# Patient Record
Sex: FEMALE | Race: WHITE | NOT HISPANIC OR LATINO | Employment: OTHER | URBAN - METROPOLITAN AREA
[De-identification: names, ages, dates, MRNs, and addresses within clinical notes are randomized per-mention and may not be internally consistent; named-entity substitution may affect disease eponyms.]

---

## 2022-10-13 ENCOUNTER — TELEPHONE (OUTPATIENT)
Dept: OBSTETRICS AND GYNECOLOGY | Facility: CLINIC | Age: 32
End: 2022-10-13
Payer: MEDICAID

## 2022-10-13 NOTE — TELEPHONE ENCOUNTER
----- Message from Fredrick Lynch sent at 10/13/2022 12:36 PM CDT -----  Type:  Sooner Appointment Request    Caller is requesting a sooner appointment.  Caller declined first available appointment listed below.  Caller will not accept being placed on the waitlist and is requesting a message be sent to doctor.    Name of Caller:  Pt  When is the first available appointment?   Denied scheduling  Symptoms:  needs to change IUD in arm  Best Call Back Number:  [phone]     Additional Information:  Trying to est care and get her implant changed.  Please advise -- thank you

## 2022-11-03 ENCOUNTER — OFFICE VISIT (OUTPATIENT)
Dept: OBSTETRICS AND GYNECOLOGY | Facility: CLINIC | Age: 32
End: 2022-11-03
Payer: MEDICAID

## 2022-11-03 VITALS
SYSTOLIC BLOOD PRESSURE: 114 MMHG | WEIGHT: 147.06 LBS | DIASTOLIC BLOOD PRESSURE: 76 MMHG | BODY MASS INDEX: 23.08 KG/M2 | HEIGHT: 67 IN | RESPIRATION RATE: 18 BRPM

## 2022-11-03 DIAGNOSIS — Z01.812 PRE-PROCEDURE LAB EXAM: ICD-10-CM

## 2022-11-03 DIAGNOSIS — Z01.419 ENCOUNTER FOR ROUTINE GYNECOLOGICAL EXAMINATION WITH PAPANICOLAOU SMEAR OF CERVIX: Primary | ICD-10-CM

## 2022-11-03 LAB
B-HCG UR QL: NEGATIVE
CTP QC/QA: YES

## 2022-11-03 PROCEDURE — 99213 OFFICE O/P EST LOW 20 MIN: CPT | Mod: PBBFAC,PN | Performed by: SPECIALIST

## 2022-11-03 PROCEDURE — 99385 PR PREVENTIVE VISIT,NEW,18-39: ICD-10-PCS | Mod: S$PBB,,, | Performed by: SPECIALIST

## 2022-11-03 PROCEDURE — 88175 CYTOPATH C/V AUTO FLUID REDO: CPT | Performed by: SPECIALIST

## 2022-11-03 PROCEDURE — 3078F DIAST BP <80 MM HG: CPT | Mod: CPTII,,, | Performed by: SPECIALIST

## 2022-11-03 PROCEDURE — 1159F PR MEDICATION LIST DOCUMENTED IN MEDICAL RECORD: ICD-10-PCS | Mod: CPTII,,, | Performed by: SPECIALIST

## 2022-11-03 PROCEDURE — 3008F PR BODY MASS INDEX (BMI) DOCUMENTED: ICD-10-PCS | Mod: CPTII,,, | Performed by: SPECIALIST

## 2022-11-03 PROCEDURE — 3008F BODY MASS INDEX DOCD: CPT | Mod: CPTII,,, | Performed by: SPECIALIST

## 2022-11-03 PROCEDURE — 3074F SYST BP LT 130 MM HG: CPT | Mod: CPTII,,, | Performed by: SPECIALIST

## 2022-11-03 PROCEDURE — 99999 PR PBB SHADOW E&M-EST. PATIENT-LVL III: CPT | Mod: PBBFAC,,, | Performed by: SPECIALIST

## 2022-11-03 PROCEDURE — 3078F PR MOST RECENT DIASTOLIC BLOOD PRESSURE < 80 MM HG: ICD-10-PCS | Mod: CPTII,,, | Performed by: SPECIALIST

## 2022-11-03 PROCEDURE — 3074F PR MOST RECENT SYSTOLIC BLOOD PRESSURE < 130 MM HG: ICD-10-PCS | Mod: CPTII,,, | Performed by: SPECIALIST

## 2022-11-03 PROCEDURE — 81025 URINE PREGNANCY TEST: CPT | Mod: PBBFAC,PN | Performed by: SPECIALIST

## 2022-11-03 PROCEDURE — 99999 PR PBB SHADOW E&M-EST. PATIENT-LVL III: ICD-10-PCS | Mod: PBBFAC,,, | Performed by: SPECIALIST

## 2022-11-03 PROCEDURE — 99385 PREV VISIT NEW AGE 18-39: CPT | Mod: S$PBB,,, | Performed by: SPECIALIST

## 2022-11-03 PROCEDURE — 1159F MED LIST DOCD IN RCRD: CPT | Mod: CPTII,,, | Performed by: SPECIALIST

## 2022-11-03 PROCEDURE — 87624 HPV HI-RISK TYP POOLED RSLT: CPT | Performed by: SPECIALIST

## 2022-11-03 RX ORDER — QUETIAPINE FUMARATE 50 MG/1
50 TABLET, FILM COATED ORAL NIGHTLY
COMMUNITY
Start: 2022-11-02

## 2022-11-03 RX ORDER — IPRATROPIUM BROMIDE AND ALBUTEROL 20; 100 UG/1; UG/1
1 SPRAY, METERED RESPIRATORY (INHALATION) EVERY 6 HOURS
COMMUNITY
Start: 2022-07-27

## 2022-11-03 RX ORDER — ETONOGESTREL 68 MG/1
1 IMPLANT SUBCUTANEOUS
COMMUNITY

## 2022-11-03 RX ORDER — LEVOCETIRIZINE DIHYDROCHLORIDE 5 MG/1
5 TABLET, FILM COATED ORAL DAILY
COMMUNITY
Start: 2022-07-27

## 2022-11-03 RX ORDER — ETONOGESTREL AND ETHINYL ESTRADIOL VAGINAL RING .015; .12 MG/D; MG/D
1 RING VAGINAL
Qty: 1 EACH | Refills: 11 | Status: SHIPPED | OUTPATIENT
Start: 2022-11-03 | End: 2023-11-03

## 2022-11-03 RX ORDER — BUDESONIDE AND FORMOTEROL FUMARATE DIHYDRATE 80; 4.5 UG/1; UG/1
AEROSOL RESPIRATORY (INHALATION)
COMMUNITY
Start: 2022-07-27

## 2022-11-03 RX ORDER — ESCITALOPRAM OXALATE 20 MG/1
20 TABLET ORAL DAILY
COMMUNITY
Start: 2022-11-02

## 2022-11-03 NOTE — PROGRESS NOTES
33 yo WF  presents for annual gyn eval as well as contraceptive management LMP   pt has nexplanon in place for 4 years. Discussed expiration need for contraception  Discussed options, Nexplanon replacement, IUD DPMA, NuvaRing and patch as well as OCP  No contraindications  After discussion, pt desires NuvaRing  Discussed dsoing  No past medical history on file.    No past surgical history on file.    No family history on file.    Social History     Socioeconomic History    Marital status: Single   Tobacco Use    Smoking status: Never     Passive exposure: Never    Smokeless tobacco: Current    Tobacco comments:     sporadic       Current Outpatient Medications   Medication Sig Dispense Refill    COMBIVENT RESPIMAT  mcg/actuation inhaler Inhale 1 puff into the lungs every 6 (six) hours.      EScitalopram oxalate (LEXAPRO) 20 MG tablet Take 20 mg by mouth once daily.      etonogestreL (NEXPLANON) 68 mg Impl subdermal device Inject 1 each into the skin.      levocetirizine (XYZAL) 5 MG tablet Take 5 mg by mouth once daily.      QUEtiapine (SEROQUEL) 50 MG tablet Take 50 mg by mouth every evening.      SYMBICORT 80-4.5 mcg/actuation HFAA SMARTSI Puff(s) By Mouth Twice Daily       No current facility-administered medications for this visit.       Review of patient's allergies indicates:   Allergen Reactions    Aspirin     Codeine sulfate     Ibuprofen        Review of System:   General: no chills, fever, night sweats, weight gain or weight loss  Psychological: no depression or suicidal ideation  Breasts: no new or changing breast lumps, nipple discharge or masses.  Respiratory: no cough, shortness of breath, or wheezing  Cardiovascular: no chest pain or dyspnea on exertion  Gastrointestinal: no abdominal pain, change in bowel habits, or black or bloody stools  Genito-Urinary: no incontinence, urinary frequency/urgency or vulvar/vaginal symptoms, pelvic pain or abnormal vaginal  bleeding.  Musculoskeletal: no gait disturbance or muscular weakness                                               General Appearance    A and O x 4, Cooperative, no distress   Breasts    Abdomen   Deferred    Soft, non-tender, bowel sounds active all four quadrants,  no masses, no organomegaly    Genitourinary:   External rectal exam shows no thrombosed external hemorrhoids.   Pelvic exam was performed with patient supine.  No labial fusion.  There is no rash, lesion or injury on the right labia.  There is no rash, lesion or injury on the left labia.  No bleeding and no signs of injury around the vaginal introitus, urethra is without lesions and well supported. The cervix is visualized with no discharge, lesions or friability.  No vaginal discharge found.  No significant Cystocele, Enterocele or rectocele, and uterus well supported.  Bimanual exam:  The urethra is normal to palpation and there are no palpable vaginal wall masses.  Uterus is not deviated, not enlarged, not fixed, normal shape and not tender.  Cervix exhibits no motion tenderness.   Right adnexum displays no mass and no tenderness.  Left adnexum displays no mass and no tenderness.   Extremities: Extremities normal, atraumatic, no cyanosis or edema                     NOTE  NURSING PERSONAL PRESENT FOR ENTIRE PHYSICAL EXAM     PAP submitted    Plan UPT andif negative will prescribe NuvaRing  Pt is to RTO next week for Nexplanon removal  Answered all questions

## 2022-11-09 LAB
CLINICAL INFO: ABNORMAL
CYTO CVX: ABNORMAL
CYTOLOGIST CVX/VAG CYTO: ABNORMAL
CYTOLOGIST CVX/VAG CYTO: ABNORMAL
CYTOLOGY CMNT CVX/VAG CYTO-IMP: ABNORMAL
CYTOLOGY PAP THIN PREP EXPLANATION: ABNORMAL
DATE OF PREVIOUS PAP: ABNORMAL
DATE PREVIOUS BX: NO
GEN CATEG CVX/VAG CYTO-IMP: ABNORMAL
HPV I/H RISK 4 DNA CVX QL NAA+PROBE: NOT DETECTED
LMP START DATE: ABNORMAL
MICROORGANISM CVX/VAG CYTO: ABNORMAL
PATHOLOGIST CVX/VAG CYTO: ABNORMAL
SERVICE CMNT-IMP: ABNORMAL
SPECIMEN SOURCE CVX/VAG CYTO: ABNORMAL
STAT OF ADQ CVX/VAG CYTO-IMP: ABNORMAL

## 2022-11-10 ENCOUNTER — TELEPHONE (OUTPATIENT)
Dept: OBSTETRICS AND GYNECOLOGY | Facility: CLINIC | Age: 32
End: 2022-11-10
Payer: MEDICAID

## 2022-11-10 NOTE — TELEPHONE ENCOUNTER
----- Message from David Palomares sent at 11/10/2022  8:15 AM CST -----  Contact: Self  Type:  Sooner Appointment Request    Caller is requesting a sooner appointment.  Caller declined first available appointment listed below.  Caller will not accept being placed on the waitlist and is requesting a message be sent to doctor.    Name of Caller:  Patient  When is the first available appointment?  12/14  Symptoms:  nexplanon removal  Best Call Back Number:  [phone]   Additional Information:   States she overslept, would like to r/s to remove implant.

## 2023-03-27 ENCOUNTER — HOSPITAL ENCOUNTER (EMERGENCY)
Facility: HOSPITAL | Age: 33
Discharge: HOME | End: 2023-03-27
Attending: EMERGENCY MEDICINE
Payer: MEDICAID

## 2023-03-27 VITALS
BODY MASS INDEX: 27.78 KG/M2 | HEART RATE: 70 BPM | DIASTOLIC BLOOD PRESSURE: 81 MMHG | TEMPERATURE: 98 F | RESPIRATION RATE: 15 BRPM | HEIGHT: 67 IN | OXYGEN SATURATION: 99 % | WEIGHT: 177 LBS | SYSTOLIC BLOOD PRESSURE: 122 MMHG

## 2023-03-27 DIAGNOSIS — L02.519: Primary | ICD-10-CM

## 2023-03-27 LAB
B-HCG UR QL: NEGATIVE
CTP QC/QA: YES

## 2023-03-27 PROCEDURE — 81025 URINE PREGNANCY TEST: CPT | Mod: ER

## 2023-03-27 PROCEDURE — 25000003 PHARM REV CODE 250: Mod: ER

## 2023-03-27 PROCEDURE — 99283 EMERGENCY DEPT VISIT LOW MDM: CPT | Mod: 25,ER

## 2023-03-27 PROCEDURE — 10061 I&D ABSCESS COMP/MULTIPLE: CPT | Mod: ER

## 2023-03-27 RX ORDER — MUPIROCIN 20 MG/G
OINTMENT TOPICAL 3 TIMES DAILY
Qty: 1 G | Refills: 0 | Status: SHIPPED | OUTPATIENT
Start: 2023-03-27 | End: 2023-04-01

## 2023-03-27 RX ORDER — MUPIROCIN 20 MG/G
OINTMENT TOPICAL
Status: COMPLETED | OUTPATIENT
Start: 2023-03-27 | End: 2023-03-27

## 2023-03-27 RX ORDER — LIDOCAINE HYDROCHLORIDE 10 MG/ML
10 INJECTION INFILTRATION; PERINEURAL
Status: COMPLETED | OUTPATIENT
Start: 2023-03-27 | End: 2023-03-27

## 2023-03-27 RX ADMIN — LIDOCAINE HYDROCHLORIDE 10 ML: 10 INJECTION, SOLUTION INFILTRATION; PERINEURAL at 06:03

## 2023-03-27 RX ADMIN — MUPIROCIN: 20 OINTMENT TOPICAL at 08:03

## 2023-03-28 NOTE — DISCHARGE INSTRUCTIONS

## 2023-03-28 NOTE — ED PROVIDER NOTES
Encounter Date: 3/27/2023    SCRIBE #1 NOTE: I, Tonya Qureshi, am scribing for, and in the presence of,  Sammy Pineda PA-C. I have scribed the following portions of the note - Other sections scribed: HPI, ROS.     History     Chief Complaint   Patient presents with    Finger Pain     Pt c/o redness and swelling to L 5th digit x1 day     33 year old female with no known PMHx presents to the ED for abscess to L 5th digit that she noticed this morning. Pt reports that she visited another ED 2 weeks ago after she sprained her left 4th digit. Pt states that she now has an abscess on her 5th digit from her provider wrapping her finger too tightly. She denies any recent trauma. Pt reports attempting to alleviate her symptoms with Tylenol(1 day ago). Pt is allergic to Ibuprofen, codeine, and Aspirin (eyes swell). LMP 02/23/2023. Denies fever, chills, SOB, rhinorrhea, congestion, chest pain, dysuria, hematuria, nausea, vomiting, diarrhea, sore throat, abdominal pain.    The history is provided by the patient. No  was used.   Review of patient's allergies indicates:   Allergen Reactions    Aspirin     Codeine sulfate     Ibuprofen      No past medical history on file.  No past surgical history on file.  No family history on file.  Social History     Tobacco Use    Smoking status: Never     Passive exposure: Never    Smokeless tobacco: Current    Tobacco comments:     sporadic     Review of Systems   Constitutional:  Negative for chills, diaphoresis, fatigue and fever.   HENT:  Negative for congestion, ear pain, nosebleeds, rhinorrhea, sinus pain, sore throat and voice change.    Eyes:  Negative for pain and redness.   Respiratory:  Negative for cough, shortness of breath and wheezing.    Cardiovascular:  Negative for chest pain and leg swelling.   Gastrointestinal:  Negative for abdominal distention, abdominal pain, blood in stool, diarrhea, nausea and vomiting.   Genitourinary:  Negative for decreased  urine volume, difficulty urinating, dysuria, flank pain, frequency, hematuria and urgency.   Musculoskeletal:  Negative for back pain, joint swelling, neck pain and neck stiffness.   Skin:  Negative for rash and wound.        (+)abscess   Neurological:  Negative for syncope, weakness and headaches.   Psychiatric/Behavioral:  Negative for confusion. The patient is not nervous/anxious.      Physical Exam     Initial Vitals [03/27/23 1649]   BP Pulse Resp Temp SpO2   127/83 103 16 97.9 °F (36.6 °C) 98 %      MAP       --         Physical Exam    Nursing note and vitals reviewed.  Constitutional: She appears well-developed and well-nourished.  Non-toxic appearance. She does not appear ill.   HENT:   Head: Normocephalic and atraumatic.   Right Ear: Hearing, tympanic membrane, external ear and ear canal normal. Tympanic membrane is not perforated, not erythematous and not bulging.   Left Ear: Hearing, tympanic membrane, external ear and ear canal normal. Tympanic membrane is not perforated, not erythematous and not bulging.   Nose: Nose normal.   Mouth/Throat: Uvula is midline, oropharynx is clear and moist and mucous membranes are normal.   Eyes: Conjunctivae and EOM are normal.   Neck: Neck supple.   Normal range of motion.   Full passive range of motion without pain.     Cardiovascular:  Normal rate and regular rhythm.           Pulses:       Radial pulses are 2+ on the right side and 2+ on the left side.   Pulmonary/Chest: Effort normal and breath sounds normal. No accessory muscle usage. No respiratory distress. She has no decreased breath sounds.   Abdominal: Abdomen is soft. Bowel sounds are normal. She exhibits no distension. There is no abdominal tenderness. There is no rebound and no guarding.   Musculoskeletal:         General: Normal range of motion.      Cervical back: Full passive range of motion without pain, normal range of motion and neck supple. No rigidity.      Comments: Full range of motion of  bilateral fingers, wrists, elbows, shoulders.  Strength and sensation intact to bilateral upper extremities.  Patient has full range of motion of left 5th digit at the MCP PIP and DIP joints.     Neurological: She is alert. No cranial nerve deficit.   Neuro intact.  Strength and sensation intact bilateral upper and lower extremities.   Skin: Skin is warm and dry.   Abscess noted to lateral aspect of left 5th digit localized to the middle and distal phalanges.  No evidence of any fusiform swelling, pain with passive extension, passive flexion, or tenderness to entire finger sheath.     Psychiatric: She has a normal mood and affect.       ED Course   I & D - Incision and Drainage    Date/Time: 3/27/2023 11:24 PM  Location procedure was performed: [facility] EMERGENCY DEPARTMENT  Performed by: Sammy Pineda PA-C  Authorized by: Gavin Siu MD   Consent Done: Yes  Consent: Verbal consent obtained.  Risks and benefits: risks, benefits and alternatives were discussed  Type: abscess  Body area: upper extremity  Location details: left small finger  Anesthesia: digital block    Anesthesia:  Local Anesthetic: lidocaine 1% without epinephrine  Scalpel size: 11  Incision type: single straight  Complexity: complex  Drainage: pus, serosanguinous and purulent  Drainage amount: moderate  Wound treatment: incision, drainage, wound left open, deloculation and expression of material  Patient tolerance: Patient tolerated the procedure well with no immediate complications      Labs Reviewed   POCT URINE PREGNANCY          Imaging Results    None          Medications   LIDOcaine HCL 10 mg/ml (1%) injection 10 mL (10 mLs Infiltration Given 3/27/23 1854)   mupirocin 2 % ointment ( Topical (Top) Given 3/27/23 2007)     Medical Decision Making:   History:   Old Medical Records: I decided to obtain old medical records.  Clinical Tests:   Lab Tests: Ordered and Reviewed  The following lab test(s) were unremarkable: UPT  ED  Management:  This is a 33 year old female with no known PMHx presents to the ED for abscess to L 5th digit that she noticed this morning. On physical exam, patient is well-appearing and in no acute distress.  Nontoxic appearing.  Lungs are clear to auscultation bilaterally.  Abdomen is soft and nontender.  No guarding, rigidity, rebound.  2+ radial pulses bilaterally.  Posterior oropharynx is not erythematous.  No edema or exudate.  Uvula midline.  Bilateral tympanic membrane is normal.  No erythema, bulging, or perforations.  Neuro intact.  Strength and sensation intact bilateral upper and lower extremities.  Abscess noted to lateral aspect of left 5th digit localized to the middle and distal phalanges.  No evidence of any fusiform swelling, pain with passive extension, passive flexion, or tenderness to entire finger sheath.  Doubt flexor tenosynovitis.Full range of motion of bilateral fingers, wrists, elbows, shoulders.  Strength and sensation intact to bilateral upper extremities.  Patient has full range of motion of left 5th digit at the MCP PIP and DIP joints.  UPT negative.  I&D performed.  Patient tolerated the procedure well with no acute complications.  Moderate amount of purulent and serosanguineous fluid were expressed.  Wound was de loculated.  Bactroban applied.  Will discharge patient on Bactroban.  Urged prompt follow-up PCP for further evaluation.    Strict return precautions given. I discussed with the patient/family the diagnosis, treatment plan, indications for return to the emergency department, and for expected follow-up. The patient/family verbalized an understanding. The patient/family is asked if there are any questions or concerns. We discuss the case, until all issues are addressed to the patient/family's satisfaction. Patient/family understands and is agreeable to the plan. Patient is stable and ready for discharge.          Scribe Attestation:   Scribe #1: I performed the above scribed  service and the documentation accurately describes the services I performed. I attest to the accuracy of the note.                   Clinical Impression:   Final diagnoses:  [L02.519] Abscess of little finger (Primary)        ED Disposition Condition    Discharge Stable          ED Prescriptions       Medication Sig Dispense Start Date End Date Auth. Provider    mupirocin (BACTROBAN) 2 % ointment Apply topically 3 (three) times daily. for 5 days 1 g 3/27/2023 4/1/2023 Sammy Pineda PA-C          Follow-up Information       Follow up With Specialties Details Why Contact Info    [facility]  Schedule an appointment as soon as possible for a visit in 2 days for further evaluation [address] [place] [address]  [phone]      [facility] ED Emergency Medicine In 2 days If symptoms worsen [address] [address] [phone]             Sammy Pineda PA-C  03/27/23 4094

## 2023-09-20 ENCOUNTER — OFFICE VISIT (OUTPATIENT)
Dept: OBSTETRICS AND GYNECOLOGY | Facility: CLINIC | Age: 33
End: 2023-09-20
Payer: MEDICAID

## 2023-09-20 ENCOUNTER — LAB VISIT (OUTPATIENT)
Dept: LAB | Facility: HOSPITAL | Age: 33
End: 2023-09-20
Attending: OBSTETRICS & GYNECOLOGY
Payer: MEDICAID

## 2023-09-20 VITALS — WEIGHT: 183 LBS | BODY MASS INDEX: 28.66 KG/M2

## 2023-09-20 DIAGNOSIS — Z34.81 ENCOUNTER FOR SUPERVISION OF OTHER NORMAL PREGNANCY IN FIRST TRIMESTER: ICD-10-CM

## 2023-09-20 DIAGNOSIS — Z32.01 POSITIVE PREGNANCY TEST: ICD-10-CM

## 2023-09-20 DIAGNOSIS — Z34.81 ENCOUNTER FOR SUPERVISION OF OTHER NORMAL PREGNANCY IN FIRST TRIMESTER: Primary | ICD-10-CM

## 2023-09-20 LAB
ABO + RH BLD: NORMAL
ANION GAP SERPL CALC-SCNC: 7 MMOL/L (ref 8–16)
BASOPHILS # BLD AUTO: 0.04 K/UL (ref 0–0.2)
BASOPHILS NFR BLD: 0.6 % (ref 0–1.9)
BLD GP AB SCN CELLS X3 SERPL QL: NORMAL
BUN SERPL-MCNC: 7 MG/DL (ref 6–20)
CALCIUM SERPL-MCNC: 9.4 MG/DL (ref 8.7–10.5)
CHLORIDE SERPL-SCNC: 108 MMOL/L (ref 95–110)
CO2 SERPL-SCNC: 23 MMOL/L (ref 23–29)
CREAT SERPL-MCNC: 0.7 MG/DL (ref 0.5–1.4)
DIFFERENTIAL METHOD: ABNORMAL
EOSINOPHIL # BLD AUTO: 0.1 K/UL (ref 0–0.5)
EOSINOPHIL NFR BLD: 1.7 % (ref 0–8)
ERYTHROCYTE [DISTWIDTH] IN BLOOD BY AUTOMATED COUNT: 12.1 % (ref 11.5–14.5)
EST. GFR  (NO RACE VARIABLE): >60 ML/MIN/1.73 M^2
GLUCOSE SERPL-MCNC: 91 MG/DL (ref 70–110)
HCT VFR BLD AUTO: 41.1 % (ref 37–48.5)
HGB BLD-MCNC: 14.4 G/DL (ref 12–16)
IMM GRANULOCYTES # BLD AUTO: 0.05 K/UL (ref 0–0.04)
IMM GRANULOCYTES NFR BLD AUTO: 0.7 % (ref 0–0.5)
LYMPHOCYTES # BLD AUTO: 1.5 K/UL (ref 1–4.8)
LYMPHOCYTES NFR BLD: 21.1 % (ref 18–48)
MCH RBC QN AUTO: 31.8 PG (ref 27–31)
MCHC RBC AUTO-ENTMCNC: 35 G/DL (ref 32–36)
MCV RBC AUTO: 91 FL (ref 82–98)
MONOCYTES # BLD AUTO: 0.6 K/UL (ref 0.3–1)
MONOCYTES NFR BLD: 9.2 % (ref 4–15)
NEUTROPHILS # BLD AUTO: 4.6 K/UL (ref 1.8–7.7)
NEUTROPHILS NFR BLD: 66.7 % (ref 38–73)
NRBC BLD-RTO: 0 /100 WBC
PLATELET # BLD AUTO: 204 K/UL (ref 150–450)
PMV BLD AUTO: 11.6 FL (ref 9.2–12.9)
POTASSIUM SERPL-SCNC: 4.2 MMOL/L (ref 3.5–5.1)
RBC # BLD AUTO: 4.53 M/UL (ref 4–5.4)
SODIUM SERPL-SCNC: 138 MMOL/L (ref 136–145)
SPECIMEN OUTDATE: NORMAL
WBC # BLD AUTO: 6.87 K/UL (ref 3.9–12.7)

## 2023-09-20 PROCEDURE — 99999 PR PBB SHADOW E&M-EST. PATIENT-LVL III: CPT | Mod: PBBFAC,,, | Performed by: OBSTETRICS & GYNECOLOGY

## 2023-09-20 PROCEDURE — 86803 HEPATITIS C AB TEST: CPT | Performed by: OBSTETRICS & GYNECOLOGY

## 2023-09-20 PROCEDURE — 99213 OFFICE O/P EST LOW 20 MIN: CPT | Mod: TH,S$PBB,, | Performed by: OBSTETRICS & GYNECOLOGY

## 2023-09-20 PROCEDURE — 87491 CHLMYD TRACH DNA AMP PROBE: CPT | Performed by: OBSTETRICS & GYNECOLOGY

## 2023-09-20 PROCEDURE — 87340 HEPATITIS B SURFACE AG IA: CPT | Performed by: OBSTETRICS & GYNECOLOGY

## 2023-09-20 PROCEDURE — 86900 BLOOD TYPING SEROLOGIC ABO: CPT | Performed by: OBSTETRICS & GYNECOLOGY

## 2023-09-20 PROCEDURE — 86592 SYPHILIS TEST NON-TREP QUAL: CPT | Performed by: OBSTETRICS & GYNECOLOGY

## 2023-09-20 PROCEDURE — 99213 PR OFFICE/OUTPT VISIT, EST, LEVL III, 20-29 MIN: ICD-10-PCS | Mod: TH,S$PBB,, | Performed by: OBSTETRICS & GYNECOLOGY

## 2023-09-20 PROCEDURE — 87389 HIV-1 AG W/HIV-1&-2 AB AG IA: CPT | Performed by: OBSTETRICS & GYNECOLOGY

## 2023-09-20 PROCEDURE — 99999 PR PBB SHADOW E&M-EST. PATIENT-LVL III: ICD-10-PCS | Mod: PBBFAC,,, | Performed by: OBSTETRICS & GYNECOLOGY

## 2023-09-20 PROCEDURE — 1159F MED LIST DOCD IN RCRD: CPT | Mod: CPTII,,, | Performed by: OBSTETRICS & GYNECOLOGY

## 2023-09-20 PROCEDURE — 3008F PR BODY MASS INDEX (BMI) DOCUMENTED: ICD-10-PCS | Mod: CPTII,,, | Performed by: OBSTETRICS & GYNECOLOGY

## 2023-09-20 PROCEDURE — 3008F BODY MASS INDEX DOCD: CPT | Mod: CPTII,,, | Performed by: OBSTETRICS & GYNECOLOGY

## 2023-09-20 PROCEDURE — 87086 URINE CULTURE/COLONY COUNT: CPT | Performed by: OBSTETRICS & GYNECOLOGY

## 2023-09-20 PROCEDURE — 99213 OFFICE O/P EST LOW 20 MIN: CPT | Mod: PBBFAC,TH | Performed by: OBSTETRICS & GYNECOLOGY

## 2023-09-20 PROCEDURE — 80048 BASIC METABOLIC PNL TOTAL CA: CPT | Performed by: OBSTETRICS & GYNECOLOGY

## 2023-09-20 PROCEDURE — 86762 RUBELLA ANTIBODY: CPT | Performed by: OBSTETRICS & GYNECOLOGY

## 2023-09-20 PROCEDURE — 1159F PR MEDICATION LIST DOCUMENTED IN MEDICAL RECORD: ICD-10-PCS | Mod: CPTII,,, | Performed by: OBSTETRICS & GYNECOLOGY

## 2023-09-20 PROCEDURE — 85025 COMPLETE CBC W/AUTO DIFF WBC: CPT | Performed by: OBSTETRICS & GYNECOLOGY

## 2023-09-20 PROCEDURE — 36415 COLL VENOUS BLD VENIPUNCTURE: CPT | Performed by: OBSTETRICS & GYNECOLOGY

## 2023-09-20 RX ORDER — PNV NO.153/FA/OM3/DHA/EPA/FISH 400-35-25
1 TABLET,CHEWABLE ORAL DAILY
Qty: 90 TABLET | Refills: 4 | Status: SHIPPED | OUTPATIENT
Start: 2023-09-20

## 2023-09-20 NOTE — LETTER
September 20, 2023    Uma Cunningham  [address] [address]              [facility] - OB GYN  [address]  [place] [address]  Phone: [phone]      To Whom It May Concern:    Ms. Cunningham is currently under our care for pregnancy.  Estimated Date of Delivery: 04/04/2024    Any elective dental work should preferably be scheduled during or after the mid-trimester or postpartum.    Dental procedures can be performed with local anesthesia without epinephrine. Recommended antibiotics include penicillins, erythromycin, and cephalosporins. Tylenol #3 or Percocet may be used for pain control. No x-rays are allowed for routine screening. For dental problems, up to four x-rays may be taken with proper abdominal shield.    Sincerely,    Miguelangel Ray MA

## 2023-09-20 NOTE — PROGRESS NOTES
9w0d. Pt reports no complaints.  Denies contractions, vaginal bleeding, or leakage of fluid.  Trisomy screening:Counseled on MaterniT 21  F/U 4 weeks. Next visit: routine care     Total time spent on visit was 20 minutes.  This includes face to face time and non-face to face time preparing to see the patient (eg, review of tests), Obtaining and/or reviewing separately obtained history, Documenting clinical information in the electronic or other health record, Independently interpreting resultsand communicating results to the patient/family/caregiver, or Care coordination.

## 2023-09-21 LAB
HBV SURFACE AG SERPL QL IA: NORMAL
HCV AB SERPL QL IA: NORMAL
HIV 1+2 AB+HIV1 P24 AG SERPL QL IA: NORMAL

## 2023-09-22 LAB
BACTERIA UR CULT: NORMAL
C TRACH DNA SPEC QL NAA+PROBE: NOT DETECTED
N GONORRHOEA DNA SPEC QL NAA+PROBE: NOT DETECTED
RPR SER QL: NORMAL
RUBV IGG SER-ACNC: 13.8 IU/ML
RUBV IGG SER-IMP: REACTIVE

## 2023-10-04 ENCOUNTER — PROCEDURE VISIT (OUTPATIENT)
Dept: MATERNAL FETAL MEDICINE | Facility: CLINIC | Age: 33
End: 2023-10-04
Payer: MEDICAID

## 2023-10-04 DIAGNOSIS — Z32.01 POSITIVE PREGNANCY TEST: ICD-10-CM

## 2023-10-04 DIAGNOSIS — Z36.89 ENCOUNTER FOR FETAL ANATOMIC SURVEY: Primary | ICD-10-CM

## 2023-10-04 PROCEDURE — 76801 OB US < 14 WKS SINGLE FETUS: CPT | Mod: PBBFAC,PO | Performed by: OBSTETRICS & GYNECOLOGY

## 2023-10-04 PROCEDURE — 76801 US OB/GYN EXTENDED PROCEDURE (VIEWPOINT): ICD-10-PCS | Mod: 26,S$PBB,, | Performed by: OBSTETRICS & GYNECOLOGY

## 2023-10-05 ENCOUNTER — PATIENT MESSAGE (OUTPATIENT)
Dept: MATERNAL FETAL MEDICINE | Facility: CLINIC | Age: 33
End: 2023-10-05
Payer: MEDICAID

## 2023-10-06 ENCOUNTER — TELEPHONE (OUTPATIENT)
Dept: OBSTETRICS AND GYNECOLOGY | Facility: CLINIC | Age: 33
End: 2023-10-06
Payer: MEDICAID

## 2023-10-06 NOTE — TELEPHONE ENCOUNTER
Pt notified of preg- safe meds. List also send list via pt portal.       ----- Message from Ally Choi sent at 10/6/2023 11:34 AM CDT -----  Regarding: patient call back  Type: Patient Call Back    Who called: Self     What is the request in detail: Asked for a call back to know what she could take for her cold.     Can the clinic reply by [facility]? No     Would the patient rather a call back or a response via [facility]? Call     Best call back number: .[phone]

## 2023-10-10 ENCOUNTER — PATIENT MESSAGE (OUTPATIENT)
Dept: OBSTETRICS AND GYNECOLOGY | Facility: HOSPITAL | Age: 33
End: 2023-10-10
Payer: MEDICAID

## 2023-12-04 ENCOUNTER — PATIENT MESSAGE (OUTPATIENT)
Dept: MATERNAL FETAL MEDICINE | Facility: CLINIC | Age: 33
End: 2023-12-04
Payer: MEDICAID

## 2023-12-11 ENCOUNTER — TELEPHONE (OUTPATIENT)
Dept: MATERNAL FETAL MEDICINE | Facility: CLINIC | Age: 33
End: 2023-12-11
Payer: MEDICAID

## 2023-12-11 NOTE — TELEPHONE ENCOUNTER
Left message with callback number. Explained calling to schedule her ultrasound is she is still following with Dr. Fonseca as her OB.   
No

## 2023-12-11 NOTE — TELEPHONE ENCOUNTER
Patient returned call and stated that she is no longer seeing Dr. Fonseca as she does not live in the area.

## 2025-07-21 ENCOUNTER — NURSE TRIAGE (OUTPATIENT)
Dept: ADMINISTRATIVE | Facility: CLINIC | Age: 35
End: 2025-07-21
Payer: MEDICAID

## 2025-07-21 NOTE — TELEPHONE ENCOUNTER
Pt states that she has already had her period this month and now pt is having her period again. Cramping noted. Pt states that she is not on any Birth control. Pt states that she is not bleeding heavy, started this morning and its a very light bleeding. Denies dizziness. Denies abdominal pain. Denies passing any tissue. Denies having had any pelvic exam or procedure recently. Dispo- See in office within 2 weeks. Pt states that she does not have an OBGYN at this time. Also, pt does not know if she is pregnant. Advised pt to take a pregnancy test. Care Advice given on Pregnancy test. Advised pt to call back with any further questions, concerns or worsening of symptoms or after she has results of home urine pregnancy test, and she needs help scheduling an appt with a OBGYN. Pt aware and VU.            Reason for Disposition   Menstrual cycle < 21 days OR > 35 days, and occurs more than two cycles (2 months) this past year    Additional Information   Negative: SEVERE vaginal bleeding (e.g., continuous red blood from vagina, or large blood clots) and very weak (can't stand)   Negative: Passed out (e.g., fainted, lost consciousness, blacked out and was not responding)   Negative: Difficult to awaken or acting confused (e.g., disoriented, slurred speech)   Negative: Shock suspected (e.g., cold/pale/clammy skin, too weak to stand, low BP, rapid pulse)   Negative: Sounds like a life-threatening emergency to the triager   Negative: SEVERE abdominal pain (e.g., excruciating)   Negative: SEVERE dizziness (e.g., unable to stand, requires support to walk, feels like passing out now)   Negative: SEVERE vaginal bleeding (e.g., soaking 2 pads or tampons per hour and present 2 or more hours; 1 menstrual cup every 2 hours)   Negative: Patient sounds very sick or weak to the triager   Negative: MODERATE vaginal bleeding (i.e., soaking pad or tampon per hour and present > 6 hours; 1 menstrual cup every 6 hours)   Negative: Constant  abdominal pain lasting > 2 hours   Negative: Pale skin (pallor) of new-onset or getting worse   Negative: Taking Coumadin (warfarin) or other strong blood thinner, or known bleeding disorder (e.g., thrombocytopenia)   Negative: Skin bruises or nosebleed and not caused by an injury   Negative: Patient wants to be seen   Negative: Passed tissue (e.g., gray-white)   Negative: Bleeding or spotting after procedure (e.g., biopsy) or pelvic examination (e.g., pap smear) that lasts > 7 days   Negative: Periods with > 6 soaked pads or tampons per day   Negative: Periods last > 7 days   Negative: Uses menstrual cups and more than 80 ml blood per menstrual period   Negative: Missed period has occurred 2 or more times in the last year and the cause is not known    Protocols used: Vaginal Bleeding - [id number]